# Patient Record
Sex: MALE | Race: OTHER | NOT HISPANIC OR LATINO | URBAN - METROPOLITAN AREA
[De-identification: names, ages, dates, MRNs, and addresses within clinical notes are randomized per-mention and may not be internally consistent; named-entity substitution may affect disease eponyms.]

---

## 2022-07-05 ENCOUNTER — EMERGENCY (EMERGENCY)
Facility: HOSPITAL | Age: 22
LOS: 1 days | Discharge: HOME | End: 2022-07-05
Attending: EMERGENCY MEDICINE
Payer: SELF-PAY

## 2022-07-05 VITALS
WEIGHT: 205.91 LBS | RESPIRATION RATE: 16 BRPM | HEART RATE: 79 BPM | OXYGEN SATURATION: 99 % | SYSTOLIC BLOOD PRESSURE: 156 MMHG | TEMPERATURE: 99 F | DIASTOLIC BLOOD PRESSURE: 80 MMHG | HEIGHT: 66 IN

## 2022-07-05 PROCEDURE — 99283 EMERGENCY DEPT VISIT LOW MDM: CPT

## 2022-07-05 RX ADMIN — Medication 1 APPLICATION(S): at 17:38

## 2022-07-05 RX ADMIN — Medication 60 MILLIGRAM(S): at 17:38

## 2022-07-05 NOTE — ED PROVIDER NOTE - OBJECTIVE STATEMENT
21yo M no pmhx presents to ED c/o pruritic rash to b/l forearms x 5 days. States rash started to left forearm 5 days ago, has since spread to b/l forearms. States rash is very itchy, has been using topical "poison ivy itch relief" cream which helps (ingredients calamine and diphenhydramine). Pt works as  and states he thinks he was exposed to poison ivy. Denies fever, chills, purulent drainage, new soaps/detergents/lotions, vesicles.

## 2022-07-05 NOTE — ED PROVIDER NOTE - NS ED ATTENDING STATEMENT MOD
This was a shared visit with the ZOLTAN. I reviewed and verified the documentation and independently performed the documented:

## 2022-07-05 NOTE — ED PROVIDER NOTE - ATTENDING APP SHARED VISIT CONTRIBUTION OF CARE
I, Tapan Fraire, have personally performed a face to face diagnostic evaluation on this patient. I have reviewed the ZOLTAN note and agree with the history, exam and plan of care, except as noted.    21 yo M p/w poison ivy to b/l arm x 5 days. patient works as a . no fever. erythematous linear lesion with few crusted leasion. no vesicles. no purulent drainage. triamcinolone cream and prednisone given.

## 2022-07-05 NOTE — ED PROVIDER NOTE - PHYSICAL EXAMINATION
Gen: No acute distress, non toxic  HEENT: Mucous membranes moist, pink conjunctivae, EOMI  Neuro: A&O x 3, moving all 4 extremities  MSK: No spine or joint tenderness to palpation  Skin: Erythematous linear lesions to b/l forearms with few areas of crusting. no surrounding erythema, drainage or induration.

## 2022-07-05 NOTE — ED PROVIDER NOTE - NSFOLLOWUPINSTRUCTIONS_ED_ALL_ED_FT
- Return to the ED for any new or worsening symptoms.   - Apply thin layer triamcinolone to affected areas 2x/day for 1-2 weeks  - Complete course of prednisone as directed  - May use benadryl 25mg ever 6 hours as needed for itching  - May continue over-the-counter itch relief cream    Contact Dermatitis    Dermatitis is redness, soreness, and swelling (inflammation) of the skin. Contact dermatitis is a reaction to certain substances that touch the skin. Many substances can cause contact dermatitis including poisonous plants, chemicals, jewelry, metals, etc. Symptoms can include dryness, redness, itching, and pain.    SEEK IMMEDIATE MEDICAL CARE IF YOU HAVE ANY OF THE FOLLOWING SYMPTOMS: headache, fever, vomiting, red streaking from the affected area, or shortness of breath.

## 2022-07-05 NOTE — ED PROVIDER NOTE - NS ED ROS FT
Gen: denies fever, chills, fatigue, weight loss  Skin: +Pruritic rash. Denies laceration, bruising  HEENT: denies visual changes, ear pain, nasal congestion, throat pain  MSK: denies joint swelling/pain, back pain, neck pain  Neuro: denies headache, dizziness, weakness, numbness

## 2022-07-05 NOTE — ED PROVIDER NOTE - CLINICAL SUMMARY MEDICAL DECISION MAKING FREE TEXT BOX
21yo M presenting with poison ivy dermatitis to b/l forearms. no superimposed cellulitis. encouraged to continue OTC itch relief cream. given topical triamcinolone and PO prednisone rx. Provided f/u information for dermatology.